# Patient Record
Sex: FEMALE | Race: OTHER
[De-identification: names, ages, dates, MRNs, and addresses within clinical notes are randomized per-mention and may not be internally consistent; named-entity substitution may affect disease eponyms.]

---

## 2018-06-29 NOTE — ER DOCUMENT REPORT
ED General





- General


Chief Complaint: Abdominal Pain


Stated Complaint: PREGNANCY CONCERNS


Time Seen by Provider: 18 19:14


Mode of Arrival: Ambulatory


Information source: Patient


Notes: 





Patient is an otherwise healthy 42-year-old female who presents with chief 

complaint of leakage of fluid from her vagina.  Patient reports that she is 

approximately 18 weeks pregnant with her first pregnancy.  Patient reports that 

yesterday she had an isolated episode of leaking clear fluid.  Patient reports 

no leakage of fluid today however she does say she has mild low abdominal 

cramping that has been intermittent.  Patient unsure if she has any fetal 

movement and she states this is her first pregnancy and she is not sure if she 

is just feeling gas bubbles.  Patient denies any vaginal bleeding.  Patient 

denies any fever or urinary symptoms.  [facility]


TRAVEL OUTSIDE OF THE [place] IN LAST 30 DAYS: No





- Related Data


Allergies/Adverse Reactions: 


 





No Known Allergies Allergy (Unverified 18 17:06)


 











Past Medical History





- General


Information source: Patient





- Social History


Smoking Status: Never Smoker


Frequency of alcohol use: Occasional


Drug Abuse: None


Family History: Reviewed & Not Pertinent


Patient has suicidal ideation: No


Patient has homicidal ideation: No





- Medical History


Medical History: Negative


Renal/ Medical History: Denies: Hx Peritoneal Dialysis


Surgical Hx: Negative





- Immunizations


Immunizations up to date: Yes





Review of Systems





- Review of Systems


Constitutional: No symptoms reported


EENT: No symptoms reported


Cardiovascular: No symptoms reported


Respiratory: No symptoms reported


Gastrointestinal: No symptoms reported


Genitourinary: No symptoms reported


Female Genitourinary: See HPI


Musculoskeletal: No symptoms reported


Skin: No symptoms reported


Hematologic/Lymphatic: No symptoms reported


Neurological/Psychological: No symptoms reported





Physical Exam





- Vital signs


Vitals: 


 











Temp Pulse Resp BP Pulse Ox


 


 98.0 F   83   16   142/89 H  97 


 


 18 17:18  18 17:18  18 17:18  18 17:18  18 17:18














- Notes


Notes: 





PHYSICAL EXAMINATION:





GENERAL: Well-appearing, well-nourished and in no acute distress.





HEAD: Atraumatic, normocephalic.





EYES: Pupils equal round and reactive to light, extraocular movements intact, 

conjunctiva are normal.





ENT: Nares patent, oropharynx clear without exudates.  Moist mucous membranes.





NECK: Normal range of motion, supple without lymphadenopathy





LUNGS: Breath sounds clear to auscultation bilaterally and equal.  No wheezes 

rales or rhonchi.





HEART: Regular rate and rhythm without murmurs





ABDOMEN: Soft, nontender, gravid abdomen.  No guarding, no rebound.  No masses 

appreciated.





Female : No CVA tenderness.





Musculoskeletal: Normal range of motion, no pitting or edema.  No cyanosis.





NEUROLOGICAL: Cranial nerves grossly intact.  Normal speech, normal gait.  

Normal sensory, motor exams





PSYCH: Normal mood, normal affect.





SKIN: Warm, Dry, normal turgor, no rashes or lesions noted.





Course





- Re-evaluation


Re-evalutation: 


42-year-old otherwise healthy female who is a  presents at 18 weeks 

gestation with possible amniotic fluid leakage.  This was one isolated incident 

that happened yesterday.  Patient denies any leakage of fluids today or any 

vaginal bleeding.  Patient appears well, nontoxic and has no complaints at the 

moment.  Will order urinalysis and send patient for an ultrasound.





Urinalysis has resulted and is within normal OB ultrasound reveals closed cervix

, fetal heart rate of 150 bpm and amniotic fluid index of 12 cm.  Patient will 

be discharged home, patient will follow up with [facility].  

Patient to return if she develops any vaginal bleeding or abdominal pain.








- Vital Signs


Vital signs: 


 











Temp Pulse Resp BP Pulse Ox


 


 98.0 F   73   18   126/84 H  99 


 


 18 22:00  18 22:00  18 22:00  18 22:00  18 22:00














Discharge





- Discharge


Clinical Impression: 


 Abdominal cramping affecting pregnancy





Pregnancy


Qualifiers:


 Weeks of gestation: 18 weeks Qualified Code(s): Z3A.18 - 18 weeks gestation of 

pregnancy





Condition: Stable


Disposition: HOME, SELF-CARE


Additional Instructions: 


Your workup today was normal.  You do not have any infection in your urine.  I 

have given you a copy of your OB ultrasound.  There are no abnormalities noted 

your amniotic fluid index is within normal limits.  Baby's heartbeat is 150 

bpm.  Likely the "gas bubbles" you are feeling is fetal movement.  Please follow

-[facility] with [facility] week for a reevaluation.  Return to 

the emergency department if you develop vaginal bleeding or worsening abdominal 

pain.


Referrals: 


JERAMIE FERNANDEZ MD [Primary Care Provider] - Follow up as needed

## 2018-06-29 NOTE — RADIOLOGY REPORT (SQ)
EXAM DESCRIPTION:  U/S OB LIMITED



COMPLETED DATE/TIME:  6/29/2018 8:21 pm



REASON FOR STUDY:  fetal well being, cervix, placenta, fluid levels



COMPARISON:  None.



TECHNIQUE:  Limited transabdominal grayscale ultrasound for evaluation of specific requested obstetri
pita parameters.



LIMITATIONS:  None.



FINDINGS:  CERVICAL LENGTH: 3 cm   Closed.

KARLIE: 12 cm.

FHR: 150 beats per minute.

PRESENTATION: Breech.

OTHER: Posterior placenta.



IMPRESSION:  LIMITED OBSTETRICAL ULTRASOUND WITH MEASURED PARAMETERS DELINEATED ABOVE.

Trimester of pregnancy: Second trimester - 13 weeks 1 day to 27 weeks 6 days.



TECHNICAL DOCUMENTATION:  JOB ID:  [id number]

 2011 [employer]- All Rights Reserved



Reading location - IP/workstation name: [email or web address]

## 2018-09-04 ENCOUNTER — HOSPITAL ENCOUNTER (EMERGENCY)
Dept: HOSPITAL 62 - ER | Age: 43
Discharge: HOME | End: 2018-09-04
Payer: OTHER GOVERNMENT

## 2018-09-04 VITALS — SYSTOLIC BLOOD PRESSURE: 112 MMHG | DIASTOLIC BLOOD PRESSURE: 64 MMHG

## 2018-09-04 DIAGNOSIS — R00.2: ICD-10-CM

## 2018-09-04 DIAGNOSIS — O26.893: Primary | ICD-10-CM

## 2018-09-04 DIAGNOSIS — Z3A.28: ICD-10-CM

## 2018-09-04 DIAGNOSIS — O23.43: ICD-10-CM

## 2018-09-04 DIAGNOSIS — I47.1: ICD-10-CM

## 2018-09-04 DIAGNOSIS — O99.413: ICD-10-CM

## 2018-09-04 LAB
ADD MANUAL DIFF: NO
ANION GAP SERPL CALC-SCNC: 10 MMOL/L (ref 5–19)
APPEARANCE UR: CLEAR
APTT PPP: YELLOW S
BASOPHILS # BLD AUTO: 0 10^3/UL (ref 0–0.2)
BASOPHILS NFR BLD AUTO: 0.3 % (ref 0–2)
BILIRUB UR QL STRIP: NEGATIVE
BUN SERPL-MCNC: 11 MG/DL (ref 7–20)
CALCIUM: 8.7 MG/DL (ref 8.4–10.2)
CHLORIDE SERPL-SCNC: 105 MMOL/L (ref 98–107)
CO2 SERPL-SCNC: 21 MMOL/L (ref 22–30)
EOSINOPHIL # BLD AUTO: 0.1 10^3/UL (ref 0–0.6)
EOSINOPHIL NFR BLD AUTO: 1.2 % (ref 0–6)
ERYTHROCYTE [DISTWIDTH] IN BLOOD BY AUTOMATED COUNT: 14.8 % (ref 11.5–14)
GLUCOSE SERPL-MCNC: 76 MG/DL (ref 75–110)
GLUCOSE UR STRIP-MCNC: 150 MG/DL
HCT VFR BLD CALC: 33.1 % (ref 36–47)
HGB BLD-MCNC: 11 G/DL (ref 12–15.5)
KETONES UR STRIP-MCNC: NEGATIVE MG/DL
LYMPHOCYTES # BLD AUTO: 1.2 10^3/UL (ref 0.5–4.7)
LYMPHOCYTES NFR BLD AUTO: 16.8 % (ref 13–45)
MCH RBC QN AUTO: 28.7 PG (ref 27–33.4)
MCHC RBC AUTO-ENTMCNC: 33.3 G/DL (ref 32–36)
MCV RBC AUTO: 86 FL (ref 80–97)
MONOCYTES # BLD AUTO: 0.5 10^3/UL (ref 0.1–1.4)
MONOCYTES NFR BLD AUTO: 6.5 % (ref 3–13)
NEUTROPHILS # BLD AUTO: 5.3 10^3/UL (ref 1.7–8.2)
NEUTS SEG NFR BLD AUTO: 75.2 % (ref 42–78)
NITRITE UR QL STRIP: NEGATIVE
PH UR STRIP: 5 [PH] (ref 5–9)
PLATELET # BLD: 397 10^3/UL (ref 150–450)
POTASSIUM SERPL-SCNC: 4.4 MMOL/L (ref 3.6–5)
PROT UR STRIP-MCNC: NEGATIVE MG/DL
RBC # BLD AUTO: 3.83 10^6/UL (ref 3.72–5.28)
SODIUM SERPL-SCNC: 136.3 MMOL/L (ref 137–145)
SP GR UR STRIP: 1.01
TOTAL CELLS COUNTED % (AUTO): 100 %
UROBILINOGEN UR-MCNC: NEGATIVE MG/DL (ref ?–2)
WBC # BLD AUTO: 7 10^3/UL (ref 4–10.5)

## 2018-09-04 PROCEDURE — 71046 X-RAY EXAM CHEST 2 VIEWS: CPT

## 2018-09-04 PROCEDURE — 99285 EMERGENCY DEPT VISIT HI MDM: CPT

## 2018-09-04 PROCEDURE — 87086 URINE CULTURE/COLONY COUNT: CPT

## 2018-09-04 PROCEDURE — 80048 BASIC METABOLIC PNL TOTAL CA: CPT

## 2018-09-04 PROCEDURE — 84484 ASSAY OF TROPONIN QUANT: CPT

## 2018-09-04 PROCEDURE — 85025 COMPLETE CBC W/AUTO DIFF WBC: CPT

## 2018-09-04 PROCEDURE — 36415 COLL VENOUS BLD VENIPUNCTURE: CPT

## 2018-09-04 PROCEDURE — 93010 ELECTROCARDIOGRAM REPORT: CPT

## 2018-09-04 PROCEDURE — 93005 ELECTROCARDIOGRAM TRACING: CPT

## 2018-09-04 PROCEDURE — 81001 URINALYSIS AUTO W/SCOPE: CPT

## 2018-09-04 NOTE — RADIOLOGY REPORT (SQ)
EXAM DESCRIPTION:  CHEST 2 VIEWS



COMPLETED DATE/TIME:  9/4/2018 6:53 pm



REASON FOR STUDY:  palpitations



COMPARISON:  None.



EXAM PARAMETERS:  NUMBER OF VIEWS: two views

TECHNIQUE: Digital Frontal and Lateral radiographic views of the chest acquired.

RADIATION DOSE: NA

LIMITATIONS: none



FINDINGS:  LUNGS AND PLEURA: No opacities, masses or pneumothorax. No pleural effusion.

MEDIASTINUM AND HILAR STRUCTURES: No masses or contour abnormalities.

HEART AND VASCULAR STRUCTURES: Heart normal size.  No evidence for failure.

BONES: No acute findings.

HARDWARE: None in the chest.

OTHER: No other significant finding.



IMPRESSION:  NO ACUTE RADIOGRAPHIC FINDING IN THE CHEST.



TECHNICAL DOCUMENTATION:  JOB ID:  [id number]

 2011 [employer]- All Rights Reserved



Reading location - IP/workstation name: [email or web address]

## 2018-09-04 NOTE — ER DOCUMENT REPORT
ED General





- General


Chief Complaint: Palpitations


Stated Complaint: IRREGULAR HEARTBEAT


Time Seen by Provider: 09/04/18 18:26


Notes: 





Patient is a 42-year-old female with history of SVT that presents to the 

emergency department for chief complaint of palpitations.  Patient states that 

she has been feeling palpitations over the last several weeks, last time was 

yesterday, she has a history of SVT, but is currently off of her beta-blocker 

during her pregnancy.  She is currently 28 weeks gravid, and has been 

progressing well for pregnancy, without complications thus far.  She is 

currently asymptomatic, denies having any chest pain, shortness of breath, 

difficulty breathing, nausea, vomiting, abdominal pain, pelvic pain, dysuria or 

hematuria.  She states she does have a cardiologist, but recently moved, and 

has not been able to follow-up with them.  She was seen at the OBs office today 

and was referred to the emergency department to be evaluated.





Past Medical History: SVT


Past Surgical History: Appendectomy, knee surgery


Social History: Denies tobacco, alcohol or drug use.


Family History: Reviewed and noncontributory for presenting illness


Allergies: Reviewed, see documented allergy list. 





REVIEW OF SYSTEMS:


Unless otherwise stated in this report the patient's positive and negative 

responses for review of systems for constitutional, eyes, ENT, cardiovascular, 

respiratory, gastrointestinal, neurological, genitourinary, musculoskeletal, 

and integumentary systems and related systems to the presenting problem are 

either as stated in the HPI or were not pertinent or were negative for the 

symptoms and/or complaints related to the presenting medical problem.





PHYSICAL EXAMINATION:





Vital signs reviewed, nursing noted reviewed. 





GENERAL: Well-appearing, well-nourished and in no acute distress.





HEAD: Atraumatic, normocephalic.





EYES: Eyes appear normal, extraocular movements intact, sclera anicteric, 

conjunctiva are normal.





ENT: nares patent, oropharynx clear without exudates.  Moist mucous membranes.





NECK: Normal range of motion, supple without lymphadenopathy





LUNGS: Breath sounds clear to auscultation bilaterally and equal.  No wheezes 

rales or rhonchi.





HEART: Regular rate and rhythm without murmurs





ABDOMEN: Soft, nontender, normoactive bowel sounds.  No rebound, guarding, or 

rigidity. No masses appreciated.





EXTREMITIES: Nontender, good range of motion, no pitting or edema.  





NEUROLOGICAL: No focal neurological deficits. Moves all extremities 

spontaneously Motor and sensory grossly intact on exam.





PSYCH: Normal mood, normal affect.





SKIN: Warm, Dry, normal turgor, no rashes or lesions noted on exposed skin





TRAVEL OUTSIDE OF THE [place] IN LAST 30 DAYS: No





- Related Data


Allergies/Adverse Reactions: 


 





No Known Allergies Allergy (Verified 09/04/18 16:50)


 











Past Medical History





- Social History


Smoking Status: Never Smoker


Family History: Reviewed & Not Pertinent


Patient has suicidal ideation: No


Patient has homicidal ideation: No


Renal/ Medical History: Denies: Hx Peritoneal Dialysis


Past Surgical History: Reports: Hx Cholecystectomy





- Immunizations


Immunizations up to date: Yes





Physical Exam





- Vital signs


Vitals: 


 











Temp Pulse Resp BP Pulse Ox


 


 97.8 F   78   16   116/65   99 


 


 09/04/18 17:40  09/04/18 17:40  09/04/18 17:40  09/04/18 17:40  09/04/18 17:40














Course





- Re-evaluation


Re-evalutation: 





09/04/18 18:40


Patient seen and examined vital signs reviewed. 





Laboratory data and imaging were ordered as appropriate for the patient's 

presenting symptoms and complaint, with consideration of any critical or life 

threatening conditions that may be associated with their obtained history and 

exam as noted above.





Results were reviewed when available and demonstrated urinary tract infection 

based on urinalysis, which was reviewed, very mild low sodium, and normal 

potassium, normal renal function, EKG was unremarkable, chest x-ray negative





The patient was re-evaluated and was stable, continue to be asymptomatic





Evaluation was most consistent with palpitations, patient not currently in SVT, 

she has not had any syncope, she is advised to check her heart rate when she 

has these episodes, and to perform Valsalva maneuvers, which she was educated on

, and to follow-up with her cardiologist and OB/GYN, she is given referral to 

one in this area.





Results were discussed with the patient at this point, after careful 

consideration I feel that that patient can be discharged from the emergency 

department, the patient was educated treatments and reasons to return to the 

emergency department based on their presumed diagnosis as noted above, they 

were advised to followup with a primary care physician in 2-3 days. Patient was 

agreeable to plan of care.  Patient was advised to follow-up with cardiology, 

advised to avoid caffeine, drink plenty of fluids, and discharged home on 

Keflex for 5 days and to follow-up with her OB/GYN.





*Note is created using voice recognition software and may contain spelling, 

syntax or grammatical errors.


09/04/18 20:40








- Vital Signs


Vital signs: 


 











Temp Pulse Resp BP Pulse Ox


 


 97.8 F   78   16   116/65   99 


 


 09/04/18 17:40  09/04/18 17:40  09/04/18 17:40  09/04/18 17:40  09/04/18 17:40














- Laboratory


Result Diagrams: 


 09/04/18 18:43





 09/04/18 18:43


Laboratory results interpreted by me: 


 











  09/04/18 09/04/18 09/04/18





  18:43 18:43 18:43


 


Hgb  11.0 L  


 


Hct  33.1 L  


 


RDW  14.8 H  


 


Sodium   136.3 L 


 


Carbon Dioxide   21 L 


 


Creatinine   0.51 L 


 


Urine Glucose (UA)    150 H














- Diagnostic Test


Radiology reviewed: Image reviewed - Negative for any acute pathology, Reports 

reviewed - Reviewed and negative for any acute pathology





- EKG Interpretation by Me


Additional EKG results interpreted by me: 





09/04/18 18:37


EKG demonstrates normal sinus rhythm with a ventricular rate of 83 bpm, normal 

axis, normal intervals, no evidence of acute ischemia, no ST changes noted.











Discharge





- Discharge


Clinical Impression: 


 Palpitations





UTI (urinary tract infection)


Qualifiers:


 Urinary tract infection type: site unspecified Hematuria presence: without 

hematuria Qualified Code(s): N39.0 - Urinary tract infection, site not specified





Condition: Good


Disposition: HOME, SELF-CARE


Instructions:  Urinary Tract Infection ([facility]), Palpitations (Irregular or Rapid 

Heartrate) ([facility])


Additional Instructions: 


Please return to the emergency department if you have any worsening, or concern 

of your symptoms.





Please return to the emergency department if you develop chest pain, difficulty 

breathing, severe abdominal pain, or ongoing vomiting.





Please follow-up with your primary care physician in 2-3 days and any other 

recommended physicians.





If prescribed, take all medications as directed.  





If you have any questions or concerns do not hesitate to return the emergency 

department for evaluation. 


Prescriptions: 


Cephalexin Monohydrate [Keflex 500 mg Capsule] 500 mg PO BID 5 Days #10 capsule


Referrals: 


JERAMIE FERNANDEZ MD [Primary Care Provider] - Follow up in 3-5 days


BARB BARRAGAN MD [EMERITUS] - Follow up in 3-5 days (cardiology)